# Patient Record
Sex: FEMALE | Race: OTHER | Employment: FULL TIME
[De-identification: names, ages, dates, MRNs, and addresses within clinical notes are randomized per-mention and may not be internally consistent; named-entity substitution may affect disease eponyms.]

---

## 2023-07-27 ENCOUNTER — HOSPITAL ENCOUNTER (OUTPATIENT)
Facility: HOSPITAL | Age: 33
Discharge: HOME | End: 2023-07-30

## 2023-07-27 ENCOUNTER — OFFICE VISIT (OUTPATIENT)
Age: 33
End: 2023-07-27

## 2023-07-27 DIAGNOSIS — N63.20 MASS OF LEFT BREAST, UNSPECIFIED QUADRANT: ICD-10-CM

## 2023-07-27 DIAGNOSIS — Z80.3 FAMILY HISTORY OF BREAST CANCER IN MOTHER: ICD-10-CM

## 2023-07-27 DIAGNOSIS — N64.52 BLOODY DISCHARGE FROM LEFT NIPPLE: Primary | ICD-10-CM

## 2023-07-27 PROCEDURE — 76642 ULTRASOUND BREAST LIMITED: CPT

## 2023-07-27 PROCEDURE — G0279 TOMOSYNTHESIS, MAMMO: HCPCS

## 2023-07-27 NOTE — PROGRESS NOTES
EVERY WOMANS LIFE HISTORY QUESTIONNAIRE       No Yes Comments   Has a doctor ever seen or felt anything wrong with your breast? []                                  [x]                                  6/12/23 Provider from [address] [place] [address] felt lump. .   Have you ever had a breast biopsy? [x]                                  []                                          When and where was last mammogram performed? N/a    Have you ever been told that there was a problem on your mammogram?   No Yes Comments   [x]                                  []                                       Do you have breast implants? No Yes Comments   [x]                                  []                                       When was your last Pap test performed? 7/17/2023 [facility]. Pt pap up to date. Have you ever had an abnormal Pap test?   No Yes Comments   [x]                                  []                                       Have you had a hysterectomy? No Yes Comments (why)   [x]                                  []                                       Have you been through menopause? No Yes Date of LMP   [x]                                  []                                  7/13/2023     Did your mother take ISAURO? No Yes Unknown   []                                  []                                  x     Do you have a history of HIV exposure? No Yes    [x]                                  []                                       Have you ever been diagnosed with any type of Cancer   No Yes Comments (type,when,where,type of treatment   [x]                                  []                                          Has a family member been diagnosed with breast or ovarian cancer?    No Yes Comments (which family members, and type   []                                  [x]                                  Mother had breast cancer at age 36     Are you

## 2023-07-27 NOTE — PROGRESS NOTES
Assessment/Plan:    Vee Kowalski was seen today for other. Diagnoses and all orders for this visit:    Bloody discharge from left nipple    Mass of left breast, unspecified quadrant    Family history of breast cancer in mother  Dx'd at age 36, doing well curently      No follow-up provider specified. SUSANNE Camara expressed understanding of this plan. An AVS was printed and given to the patient.      ----------------------------------------------------------------------    Chief Complaint   Patient presents with    Other     [facility]       History of Present Illness:  Pt presents for eval at [facility] by referral from  for left breast masses and hx of recent bloody nipple discharge  The sxs started 3-4 months ago. The nipple discharge has resolved (last child born 8 years ago and not breast fed)  The mass in her areolar region has persisted. She has breast tenderness with periods  She is         No past medical history on file. No current outpatient medications on file. No current facility-administered medications for this visit. No Known Allergies    Social History     Tobacco Use    Smoking status: Never    Smokeless tobacco: Never       No family history on file. Physical Exam:     LMP 2023     A&Ox3  WDWN NAD  Respirations normal and non labored  Breast exam- rachid neg for dimpling, retractions, skin color changes.  Left breast 12 oclock adjacent to nipple area there is a firm rubbery mass and within the areolar area there is a bb sized mobile firm mass (which is what the pt felt)

## 2023-07-28 DIAGNOSIS — N63.20 MASS OF LEFT BREAST ON MAMMOGRAM: Primary | ICD-10-CM

## 2023-08-02 ENCOUNTER — HOSPITAL ENCOUNTER (OUTPATIENT)
Facility: HOSPITAL | Age: 33
Discharge: HOME | End: 2023-08-05

## 2023-08-02 DIAGNOSIS — R92.8 ABNORMAL MAMMOGRAM: ICD-10-CM

## 2023-08-02 DIAGNOSIS — N63.20 MASS OF LEFT BREAST ON MAMMOGRAM: ICD-10-CM

## 2023-08-02 PROCEDURE — 88305 TISSUE EXAM BY PATHOLOGIST: CPT

## 2023-08-02 PROCEDURE — 77065 DX MAMMO INCL CAD UNI: CPT

## 2023-08-02 PROCEDURE — 19083 BX BREAST 1ST LESION US IMAG: CPT

## 2023-08-02 RX ORDER — LIDOCAINE HYDROCHLORIDE 10 MG/ML
2 INJECTION, SOLUTION INFILTRATION; PERINEURAL ONCE
Status: DISCONTINUED | OUTPATIENT
Start: 2023-08-02 | End: 2023-08-06 | Stop reason: HOSPADM

## 2023-08-02 RX ORDER — LIDOCAINE HYDROCHLORIDE AND EPINEPHRINE 10; 10 MG/ML; UG/ML
2 INJECTION, SOLUTION INFILTRATION; PERINEURAL ONCE
Status: DISCONTINUED | OUTPATIENT
Start: 2023-08-02 | End: 2023-08-06 | Stop reason: HOSPADM

## 2023-08-07 ENCOUNTER — NURSE ONLY (OUTPATIENT)
Age: 33
End: 2023-08-07

## 2023-08-07 NOTE — PROGRESS NOTES
Encounter completed in re: finalizing patient's referral in to the [facility] [address] program.   Patient referred to us for diagnostic imaging for  abnormal breast symptoms by [facility]. Pt enrolled in [facility] Life program and was sent to have diagnostic images on 7/27/2023- resulted in needing a biopsy which was done on 8/2/2023. The provider was routed the imaging report and recommendation and pt was told of the results at the time of the appt.  Patient has been billed in [facility], RN

## 2023-08-10 ENCOUNTER — TELEPHONE (OUTPATIENT)
Age: 33
End: 2023-08-10

## 2023-08-10 NOTE — TELEPHONE ENCOUNTER
A referral from the [facility] received for need for colposcopy. Chart reviewed, pt was recently seen at [facility] [address] for diagnostic breast work up and EWL paperwork and eligibility are up to date. Nurse spoke with patient about need for colposcopy and patient does want to have appt. Through [facility] Will assist in making appt. For colpo, per patient she would like to have this done on 8/24 pm if possible at [facility] location, no provider gender preference. Patient states that she had a colposcopy back in 2013.

## 2023-08-11 NOTE — TELEPHONE ENCOUNTER
An appt. For patient to see Dr. Teri Srinivasan at [address] [place] [address] has been scheduled. Patient has been contacted and agrees with date, time and location of appt. All questions answered.  Constantin Tovar RN

## 2023-08-24 ENCOUNTER — OFFICE VISIT (OUTPATIENT)
Age: 33
End: 2023-08-24

## 2023-08-24 VITALS — WEIGHT: 121 LBS | DIASTOLIC BLOOD PRESSURE: 59 MMHG | SYSTOLIC BLOOD PRESSURE: 108 MMHG

## 2023-08-24 DIAGNOSIS — R87.610 ATYPICAL SQUAMOUS CELLS OF UNDETERMINED SIGNIFICANCE ON CYTOLOGIC SMEAR OF CERVIX (ASC-US): Primary | ICD-10-CM

## 2023-08-24 DIAGNOSIS — Z32.01 POSITIVE PREGNANCY TEST: ICD-10-CM

## 2023-08-24 LAB
HCG, PREGNANCY, URINE, POC: POSITIVE
VALID INTERNAL CONTROL, POC: NORMAL

## 2023-08-24 PROCEDURE — 81025 URINE PREGNANCY TEST: CPT | Performed by: OBSTETRICS & GYNECOLOGY

## 2023-08-24 PROCEDURE — 99202 OFFICE O/P NEW SF 15 MIN: CPT | Performed by: OBSTETRICS & GYNECOLOGY

## 2023-08-24 NOTE — PROGRESS NOTES
Rudy Platt is a 29 y.o. female presents for a problem visit. No chief complaint on file. Problems: Abnormal Pap -- ASCUS with positive HR HPV. Referred by E[facility]. Negative UPT there 2 weeks ago. Comes in for colpo. Patient's last menstrual period was 07/13/2023. Birth Control: condoms. Last Pap: abnormal obtained 1 month ago. UPT POSITIVE. Advised does not need colpo for atypia while she is pregnant. Pt did not want to be pregnant. Advised to sort that out and get referral for pregnancy care to someone from EW. Her pap can be repeated in a year. We do not biopsy the cervix in pregnant women. 1. Have you been to the ER, urgent care clinic, or hospitalized since your last visit? No    2. Have you seen or consulted any other health care providers outside of the [address] [place] [address] since your last visit? No        Chart reviewed for the following:   Meghan KNIGHT LPN, have reviewed the History, Physical and updated the Allergic reactions for [address] performed immediately prior to start of procedure:   Karuna Dillon LPN, have performed the following reviews on Rudy Platt prior to the start of the procedure:            * Patient was identified by name and date of birth   * Agreement on procedure being performed was verified  * Risks and Benefits explained to the patient  * Procedure site verified and marked as necessary  * Patient was positioned for comfort  * Consent was signed and verified     Time: 2 pm    Date of procedure: 8/24/2023    Procedure performed by: Latricia Olsen MD       Provider assisted by: AS LPN    Patient assisted by: self    How tolerated by patient: tolerated the procedure well with no complications    Post Procedural Pain Scale: 0 - No Hurt    Comments: none    Examination chaperoned by Meghan García LPN.

## 2023-08-25 ENCOUNTER — TELEPHONE (OUTPATIENT)
Age: 33
End: 2023-08-25

## 2023-08-25 NOTE — TELEPHONE ENCOUNTER
Calling patient in re: follow up after colposcopy on 8/24/2023. Patient did not answer, and voicemail not active. Patient is pregnant, will need to repeat pap smear in 1 year. Need to discharge from program as her diagnotic cervical/breast services has been completed.

## 2023-08-25 NOTE — TELEPHONE ENCOUNTER
Patient called back, pt will follow up with [facility] about prenatal care and applying for [facility]. A copy of the pregnancy test results is being sent to patient to be able to apply for the [facility]. Patient aware that her breast and cervical test are now complete and she is now discharge from the [address] Every [employer] program, pt will call in case she gets a bills from the breast biopsy or colposcopy consult. This has been fully explained to the patient, who indicates understanding and agrees with plan. No further questions at this time.  Kishan Dyson RN

## 2023-10-02 ENCOUNTER — OFFICE VISIT (OUTPATIENT)
Age: 33
End: 2023-10-02

## 2023-10-02 VITALS
WEIGHT: 118.19 LBS | RESPIRATION RATE: 16 BRPM | HEART RATE: 79 BPM | BODY MASS INDEX: 23.2 KG/M2 | SYSTOLIC BLOOD PRESSURE: 117 MMHG | OXYGEN SATURATION: 100 % | HEIGHT: 60 IN | DIASTOLIC BLOOD PRESSURE: 73 MMHG | TEMPERATURE: 97.5 F

## 2023-10-02 DIAGNOSIS — O03.9 MISCARRIAGE: Primary | ICD-10-CM

## 2023-10-02 PROCEDURE — 99202 OFFICE O/P NEW SF 15 MIN: CPT | Performed by: OBSTETRICS & GYNECOLOGY

## 2023-10-03 LAB — HCG INTACT+B SERPL-ACNC: 343 MIU/ML

## 2023-10-18 ENCOUNTER — OFFICE VISIT (OUTPATIENT)
Age: 33
End: 2023-10-18

## 2023-10-18 VITALS — BODY MASS INDEX: 23.44 KG/M2 | WEIGHT: 120 LBS | DIASTOLIC BLOOD PRESSURE: 67 MMHG | SYSTOLIC BLOOD PRESSURE: 101 MMHG

## 2023-10-18 DIAGNOSIS — O03.9 COMPLETE MISCARRIAGE: Primary | ICD-10-CM

## 2023-10-18 PROCEDURE — 99212 OFFICE O/P EST SF 10 MIN: CPT | Performed by: OBSTETRICS & GYNECOLOGY

## 2023-10-18 NOTE — PROGRESS NOTES
Leonila Yang is a 29 y.o. female who presents today for the following:  Chief Complaint   Patient presents with    Follow-up     Had miscarriage 23. She is here to make sure miscarriage is complete          HPI  Patient is a 79-year-old G3,  female who presents 2 weeks post spontaneous miscarriage. She reports her bleeding is now stopped. She is without complaints on presentation today. OB History          3    Para   2    Term   2            AB   1    Living   2         SAB   1    IAB        Ectopic        Molar        Multiple        Live Births                          @VS2@   OBGyn Exam   Patient is a well-developed well-nourished female no apparent distress  She is alert and oriented x3  Pelvic  External genitalia are within normal limits  Urethra is midline there are no apparent urethral lesions the bladder is within normal limits  Vagina is with normal rugae there is minimal discharge present in the vaginal vault  Cervix is parous, there is no apparent cervical lesion, there is no cervical motion tenderness  Uterus is normal size and contours  Adnexa are without masses  [unfilled]       Assessment:  Normal post miscarriage exam  Plan: Follow-up as needed and yearly    No orders of the defined types were placed in this encounter.

## 2023-12-11 ENCOUNTER — TELEPHONE (OUTPATIENT)
Age: 33
End: 2023-12-11

## 2023-12-11 NOTE — TELEPHONE ENCOUNTER
Patient calling the [facility] main office ID x2. Patient states that she was seen through our EWL program for need for colposcopy but Colposcopy was not done due to that she was pregnant at the time. Pt had a miscarriage 09/2023. Pt would like to follow up in re: abnormal pap and need for diagnostic cervical procedure. [facility] [employer] [facility] program eligibility was reassess and patient does meet requirement for program. New e-form was filled out.

## 2023-12-11 NOTE — TELEPHONE ENCOUNTER
Because her result was just atypia with positive HR HPV, she can just repeat that in 6 to 12 months.

## 2023-12-13 NOTE — TELEPHONE ENCOUNTER
Called and spoke with patient ID [id number]. Discussed that Dr. Ramona Mullen is recommending that she repeats her pap smear 6 month-1 year from last pap smear. Instructed patient to call the [facility] Department (Referring provider) to make an appt. For a repeat pap smear. If abnormal pap smear she can return to our EW program for coordination of the colposcopy. I have sent this telephone encounter except this note to the [address] [place] [address] clinic via [employer]. This has been fully explained to the patient, who indicates understanding and agrees with plan. No further questions at this time.     Angela Richards RN

## 2024-01-30 ENCOUNTER — TELEPHONE (OUTPATIENT)
Age: 34
End: 2024-01-30

## 2024-01-30 NOTE — TELEPHONE ENCOUNTER
Patient called the main office and left a voicemail requesting a call back. I called the number on file and number is not in service. I called the number provided on message and left a voicemail that I was returning a phone call. Melissa Espinosa RN

## 2024-01-30 NOTE — TELEPHONE ENCOUNTER
Patient returned phone call ID x2. States that she is waiting for a colposcopy appt. She had her pap smear at [facility] in 12/2023 and was told she was going to get referred to Every Woman[facility] Life. I reviewed the referrals received and we have not received her new referral information. Patient will call the health department and asked them to send us the referral paperwork. Melissa Espinosa RN

## 2024-02-12 ENCOUNTER — TELEPHONE (OUTPATIENT)
Age: 34
End: 2024-02-12

## 2024-02-12 NOTE — TELEPHONE ENCOUNTER
Patient calling ID x 2. States that she has called the [facility] to follow up in re: need for colposcopy and that she had talked to Monserrat the . At this time we still have not received a new referral for Colposcopy. Will reach out to Monserrat Kirk at the [facility] to follow up about this, as this is the 2nd time patient has been calling our office about this. Melissa Espinosa RN

## 2024-02-14 ENCOUNTER — TELEPHONE (OUTPATIENT)
Age: 34
End: 2024-02-14

## 2024-02-19 NOTE — TELEPHONE ENCOUNTER
Received referral documentation from [facility] Department for a need for Colposcopy. Pt was a previous EWL participant and [facility] Every Woman's Life paperwork up to date until 07/2024. Pt will needs to be re assess in re insurance status, income, household # and address confirmed. Update the EWL questionnaire and if she continues to qualify coordinate appt. With Dr[facility] as he has seen patient in the past.    Called patient for the above, no answer. Left a message to call back to our main office. Melissa Espinosa RN

## 2024-02-20 NOTE — TELEPHONE ENCOUNTER
The [facility]  was used. Called pt to inform her that she needed a colposcopy. Explained to pt about her abnormal pap results, what a colposcopy was and why it was needed. Explained that [facility] would be making her appt for her and got the best day of the week from her and time. Explained to pt we would be sending a packet with a letter with all of her appointment information and pamphlets/information about colposcopy but went over the \"Getting ready for the procedure\" items.  Answered all of pt's questions until she was able to verbalize understanding and denied anymore questions. Did tell pt if she had anymore questions she could reach out to the [facility] office, pt has had colposcopy done before.Pt understands that once the appointment is made by [facility] staff the patient will also receive a text message with her appointment location, address and office phone number, date, time, provider she will be seeing and the [facility] office number. Pt denies any other questions and verbalized understanding. Patient's eligibility for the [facility] Woman's Life program was reassess today- per patient her income, household and insurance status has not change, demographic information up to date in [facility].  Pt's previous [facility] paperwork for eligibility is until 7/2024. Pt wants afternoon appt with Dr. [facility] at [facility],pt seen by him in the past, pt would like afternoon but first available day. Appt made and text message and letter to be sent.JEANINE MOORE, RN      EVERY WOMANS LIFE HISTORY QUESTIONNAIRE       No Yes Comments   Has a doctor ever seen or felt anything wrong with your breast? []                                  [x]                                  6/12/2023 lump felt by [facility]  provider   Have you ever had a breast biopsy? []                                  [x]                                  8/2/2023 [facility] pt had left breast biopsy. Benign        When and where was last mammogram performed?

## 2024-02-29 ENCOUNTER — TELEPHONE (OUTPATIENT)
Age: 34
End: 2024-02-29

## 2024-02-29 NOTE — TELEPHONE ENCOUNTER
Patient calling to confirm the details of her upcoming appt. With Dr. Lowe at [facility]. Chart reviewed and details confirmed with patient. Melissa Espinosa RN

## 2024-03-04 ENCOUNTER — PROCEDURE VISIT (OUTPATIENT)
Age: 34
End: 2024-03-04

## 2024-03-04 VITALS
SYSTOLIC BLOOD PRESSURE: 112 MMHG | BODY MASS INDEX: 24.15 KG/M2 | WEIGHT: 123 LBS | DIASTOLIC BLOOD PRESSURE: 72 MMHG | HEIGHT: 60 IN

## 2024-03-04 DIAGNOSIS — R87.810 CERVICAL HIGH RISK HUMAN PAPILLOMAVIRUS (HPV) DNA TEST POSITIVE: ICD-10-CM

## 2024-03-04 DIAGNOSIS — R87.619 ABNORMAL CERVICAL PAPANICOLAOU SMEAR, UNSPECIFIED ABNORMAL PAP FINDING: Primary | ICD-10-CM

## 2024-03-04 LAB
HCG, PREGNANCY, URINE, POC: NEGATIVE
VALID INTERNAL CONTROL, POC: NORMAL

## 2024-03-04 PROCEDURE — 81025 URINE PREGNANCY TEST: CPT | Performed by: OBSTETRICS & GYNECOLOGY

## 2024-03-04 PROCEDURE — 57454 BX/CURETT OF CERVIX W/SCOPE: CPT | Performed by: OBSTETRICS & GYNECOLOGY

## 2024-03-04 SDOH — ECONOMIC STABILITY: FOOD INSECURITY: WITHIN THE PAST 12 MONTHS, THE FOOD YOU BOUGHT JUST DIDN'T LAST AND YOU DIDN'T HAVE MONEY TO GET MORE.: NEVER TRUE

## 2024-03-04 SDOH — ECONOMIC STABILITY: HOUSING INSECURITY
IN THE LAST 12 MONTHS, WAS THERE A TIME WHEN YOU DID NOT HAVE A STEADY PLACE TO SLEEP OR SLEPT IN A SHELTER (INCLUDING NOW)?: NO

## 2024-03-04 SDOH — ECONOMIC STABILITY: FOOD INSECURITY: WITHIN THE PAST 12 MONTHS, YOU WORRIED THAT YOUR FOOD WOULD RUN OUT BEFORE YOU GOT MONEY TO BUY MORE.: NEVER TRUE

## 2024-03-04 SDOH — ECONOMIC STABILITY: INCOME INSECURITY: HOW HARD IS IT FOR YOU TO PAY FOR THE VERY BASICS LIKE FOOD, HOUSING, MEDICAL CARE, AND HEATING?: NOT HARD AT ALL

## 2024-03-04 ASSESSMENT — PATIENT HEALTH QUESTIONNAIRE - PHQ9
1. LITTLE INTEREST OR PLEASURE IN DOING THINGS: 0
SUM OF ALL RESPONSES TO PHQ QUESTIONS 1-9: 0
SUM OF ALL RESPONSES TO PHQ9 QUESTIONS 1 & 2: 0
2. FEELING DOWN, DEPRESSED OR HOPELESS: 0
SUM OF ALL RESPONSES TO PHQ QUESTIONS 1-9: 0

## 2024-03-07 LAB
CPT BILLING CODE: NORMAL
DIAGNOSIS SYNOPSIS:: NORMAL
DX ICD CODE: NORMAL
DX ICD CODE: NORMAL
Lab: NORMAL
PATH REPORT.FINAL DX SPEC: NORMAL
PATH REPORT.GROSS SPEC: NORMAL
PATH REPORT.RELEVANT HX SPEC: NORMAL
PATH REPORT.SITE OF ORIGIN SPEC: NORMAL
PATHOLOGIST NAME: NORMAL
PAYMENT PROCEDURE: NORMAL

## 2024-03-12 ENCOUNTER — NURSE ONLY (OUTPATIENT)
Age: 34
End: 2024-03-12

## 2024-03-12 NOTE — PROGRESS NOTES
Encounter completed in re: finalizing patient's referral in to the [facility] Every Woman's Life program.   Patient referred to us for colposcopy by the [facility]. Pt enrolled in [facility] Every Woman's Life program and was sent to have colposcopy on 3/4/2024- resulted DIONNE 1. The provider was routed the office note and pathology  report  and pt was told of the results. Patient has been billed in [employer].  AJITH FOFANA RN

## 2024-03-14 ENCOUNTER — TELEPHONE (OUTPATIENT)
Age: 34
End: 2024-03-14

## 2024-03-14 NOTE — TELEPHONE ENCOUNTER
Patient called back- states that she had questions about her [facility] billing. Her  paid in error visit with Dr. Lowe for colposcopy on 3/4/2024- this visit was supposed to be under EWL. I have sent a message to [employer] to review the visit and get patient a refund is appropriate. Melissa Espinosa RN

## 2024-03-14 NOTE — TELEPHONE ENCOUNTER
Patient has called our [facility] office multiple times this week. I am returning her phone call, no answer. Left message that I was returning phone call. Melissa Espinosa RN

## 2024-07-16 ENCOUNTER — TELEPHONE (OUTPATIENT)
Age: 34
End: 2024-07-16

## 2024-07-16 VITALS — BODY MASS INDEX: 24.15 KG/M2 | WEIGHT: 123 LBS | HEIGHT: 60 IN

## 2024-07-16 NOTE — TELEPHONE ENCOUNTER
Pt received letter from mammogram suite reminding her of annual screening mammogram. Patient calling our [facility] [employer] Woman[facility] Life program to see if we can provide her with an appt. Chart reviewed, pt is under 40 but has a family hx of breast cancer. Maxine Ervin RN completed Tyrer Margeck high risk score and resulted in less than 20%. Patient does not meet guidelines for EWL to cover the screening mammogram. If patient would still like to get test done, we can provide her with an appt. At one of our clinics but test will be covered with [employer] funds.     I called patient this PM to let her know the above, she did not answer. Left message for a call back. Melissa Espinosa RN      Pt will need to be screened financially for [employer] before providing the appt. Melissa Espinosa RN

## 2024-07-16 NOTE — TELEPHONE ENCOUNTER
PAUL (International Breast Cancer Intervation Study) Online sosaer-scooter model breast cancer risk evaluation tool resulted in 14.82 % based on history collected last year. AJITH FOFANA RN

## 2024-07-16 NOTE — TELEPHONE ENCOUNTER
Pt called back and meets the qualifications for ju [facility]. I have provided pt with an appt. And she has agreed with appt. Information. Melissa Espinosa RN

## 2024-10-24 ENCOUNTER — HOSPITAL ENCOUNTER (OUTPATIENT)
Facility: HOSPITAL | Age: 34
Discharge: HOME | End: 2024-10-27

## 2024-10-24 ENCOUNTER — OFFICE VISIT (OUTPATIENT)
Age: 34
End: 2024-10-24

## 2024-10-24 DIAGNOSIS — N63.0 BREAST MASS IN FEMALE: Primary | ICD-10-CM

## 2024-10-24 DIAGNOSIS — Z12.31 VISIT FOR SCREENING MAMMOGRAM: ICD-10-CM

## 2024-10-24 PROCEDURE — 77063 BREAST TOMOSYNTHESIS BI: CPT

## 2024-10-24 NOTE — PROGRESS NOTES
Assessment/Plan:    Tomasa was seen today for annual exam.    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    Araceli Christianson PA-C  Tomasa Kaufman expressed understanding of this plan. An AVS was printed and given to the patient.      ----------------------------------------------------------------------    Chief Complaint   Patient presents with    Annual Exam     Every Woman's Life program         History of Present Illness:  Pt presents to [facility] for breast problem, left breast  She had bx last year which was benign  She reports no new problems  Having periods,    No risk for DV       No past medical history on file.    No current outpatient medications on file.     No current facility-administered medications for this visit.       No Known Allergies    Social History     Tobacco Use    Smoking status: Never    Smokeless tobacco: Never   Vaping Use    Vaping status: Never Used   Substance Use Topics    Alcohol use: Not Currently    Drug use: Never       Family History   Problem Relation Age of Onset    Breast Cancer Mother 40       Physical Exam:     LMP 10/21/2024 (Exact Date)     A&Ox3  WDWN NAD  Respirations normal and non labored  Breast exam- rachid neg for mass, tenderness, skin color changes, dimpling or retractions

## 2024-10-24 NOTE — PROGRESS NOTES
EVERY WOMANS LIFE HISTORY QUESTIONNAIRE       No Yes Comments   Has a doctor ever seen or felt anything wrong with your breast? []                                  [x]                                  Left breast lump found    Have you ever had a breast biopsy? []                                  [x]                                  Biopsy done 8/2/23 and was fibroadenoma.         When and where was last mammogram performed?  [facility] 7/27/2023    Have you ever been told that there was a problem on your mammogram?   No Yes Comments   []                                  [x]                                  See above for last years results and biopsy.      Do you have breast implants?   No Yes Comments   [x]                                  []                                       When was your last Pap test performed? [facility] done 12/15/2023 and not eligible for pap services today.     Have you ever had an abnormal Pap test?   No Yes Comments   [x]                                  []                                       Have you had a hysterectomy?   No Yes Comments (why)   [x]                                  []                                       Have you been through menopause?   No Yes Date of LMP   [x]                                  []                                  10/21/2024     Did your mother take ISAURO?  No Yes Unknown   []                                  []                                  x     Do you have a history of HIV exposure?  No Yes    [x]                                  []                                       Have you ever been diagnosed with any type of Cancer   No Yes Comments (type,when,where,type of treatment   [x]                                  []                                          Has a family member been diagnosed with breast or ovarian cancer?   No Yes Comments (which family members, and type   []                                  [x]

## 2024-10-30 ENCOUNTER — TELEPHONE (OUTPATIENT)
Age: 34
End: 2024-10-30

## 2024-10-30 VITALS — HEIGHT: 60 IN | WEIGHT: 123 LBS | BODY MASS INDEX: 24.15 KG/M2

## 2024-10-30 NOTE — TELEPHONE ENCOUNTER
PAUL (International Breast Cancer Intervation Study) Online gerardo-scooter model breast cancer risk evaluation tool resulted in 14.79 %. AJITH FOFANA RN

## 2025-08-21 ENCOUNTER — TELEPHONE (OUTPATIENT)
Age: 35
End: 2025-08-21

## 2025-08-26 ENCOUNTER — TELEPHONE (OUTPATIENT)
Age: 35
End: 2025-08-26

## 2025-08-27 VITALS — WEIGHT: 123 LBS | HEIGHT: 60 IN | BODY MASS INDEX: 24.15 KG/M2
